# Patient Record
Sex: MALE | Race: WHITE | Employment: FULL TIME | URBAN - METROPOLITAN AREA
[De-identification: names, ages, dates, MRNs, and addresses within clinical notes are randomized per-mention and may not be internally consistent; named-entity substitution may affect disease eponyms.]

---

## 2021-10-25 ENCOUNTER — HOSPITAL ENCOUNTER (EMERGENCY)
Age: 36
Discharge: LEFT BEFORE DISCHARGE | End: 2021-10-25
Attending: EMERGENCY MEDICINE

## 2021-10-25 VITALS
HEART RATE: 94 BPM | HEIGHT: 74 IN | OXYGEN SATURATION: 96 % | WEIGHT: 245 LBS | TEMPERATURE: 98.8 F | RESPIRATION RATE: 18 BRPM | DIASTOLIC BLOOD PRESSURE: 88 MMHG | SYSTOLIC BLOOD PRESSURE: 147 MMHG | BODY MASS INDEX: 31.44 KG/M2

## 2021-10-25 DIAGNOSIS — Z53.21 PATIENT LEFT WITHOUT BEING SEEN: Primary | ICD-10-CM

## 2021-10-25 PROCEDURE — 75810000275 HC EMERGENCY DEPT VISIT NO LEVEL OF CARE

## 2021-10-26 NOTE — ED TRIAGE NOTES
Arrives with face mask in place. Arrives via [place] [facility] service with reports left shoulder pain. States initially began in HS while playing baseball. Seen by Louis Robin in past, was beginning proceedings for rotator cuff repair however never followed back up. Reports fall onto left shoulder couple years ago. EMS reports meth use approx 1 hour pta, endorses daily use. Friend with pt transported to [facility] for overdose tonight.  A/O X4 on arrival.

## 2021-10-27 NOTE — ED PROVIDER NOTES
Patient left without being seen        Drug Overdose    Shoulder Pain          Past Medical History:   Diagnosis Date    Infectious disease     Liver disease     hep c    Psychiatric disorder     PTSD, anxiety, depression    Seizures ([facility] [address]        Past Surgical History:   Procedure Laterality Date    HX HEENT      t&a    HX ORTHOPAEDIC      right knee     HX UROLOGICAL      cysto, kidney stone         No family history on file. Social History     Socioeconomic History    Marital status: SINGLE     Spouse name: Not on file    Number of children: Not on file    Years of education: Not on file    Highest education level: Not on file   Occupational History    Not on file   Tobacco Use    Smoking status: Current Every Day Smoker     Packs/day: 0.25    Smokeless tobacco: Never Used   Substance and Sexual Activity    Alcohol use: No    Drug use: No    Sexual activity: Not on file   Other Topics Concern    Not on file   Social History Narrative    Not on file     Social Determinants of Health     Financial Resource Strain:     Difficulty of Paying Living Expenses:    Food Insecurity:     Worried About Running Out of Food in the Last Year:     [address] in the Last Year:    Transportation Needs:     Lack of Transportation (Medical):  Lack of Transportation (Non-Medical):    Physical Activity:     Days of Exercise per Week:     Minutes of Exercise per Session:    Stress:     Feeling of Stress :    Social Connections:     Frequency of Communication with Friends and Family:     Frequency of Social Gatherings with Friends and Family:     Attends Muslim Services:     Active Member of Clubs or Organizations:     Attends Club or Organization Meetings:     Marital Status:    Intimate Partner Violence:     Fear of Current or Ex-Partner:     Emotionally Abused:     Physically Abused:     Sexually Abused:           ALLERGIES: Depakote [divalproex], Dilantin [phenytoin sodium extended], Ketamine, Lidocaine, Nubain [nalbuphine], Stadol [butorphanol tartrate], Toradol [ketorolac tromethamine], and Ultram [tramadol]    Review of Systems    Vitals:    10/25/21 2021   BP: (!) 147/88   Pulse: 94   Resp: 18   Temp: 98.8 °F (37.1 °C)   SpO2: 96%   Weight: 111.1 kg (245 lb)   Height: 6' 2\" (1.88 m)            Physical Exam     MDM       Procedures

## 2021-12-02 ENCOUNTER — HOSPITAL ENCOUNTER (EMERGENCY)
Age: 36
Discharge: HOME | End: 2021-12-02
Attending: EMERGENCY MEDICINE

## 2021-12-02 VITALS
DIASTOLIC BLOOD PRESSURE: 84 MMHG | SYSTOLIC BLOOD PRESSURE: 133 MMHG | HEIGHT: 74 IN | BODY MASS INDEX: 32.02 KG/M2 | WEIGHT: 249.5 LBS | TEMPERATURE: 98.2 F | HEART RATE: 108 BPM | RESPIRATION RATE: 18 BRPM | OXYGEN SATURATION: 99 %

## 2021-12-02 DIAGNOSIS — L08.9 INFECTED SKIN LESION: Primary | ICD-10-CM

## 2021-12-02 PROCEDURE — 99282 EMERGENCY DEPT VISIT SF MDM: CPT

## 2021-12-02 RX ORDER — ALPRAZOLAM 2 MG/1
TABLET ORAL
COMMUNITY
Start: 2021-11-10

## 2021-12-02 RX ORDER — HYDROXYZINE PAMOATE 25 MG/1
25 CAPSULE ORAL
COMMUNITY
Start: 2021-11-16

## 2021-12-02 RX ORDER — BUPRENORPHINE HYDROCHLORIDE AND NALOXONE HYDROCHLORIDE DIHYDRATE 8; 2 MG/1; MG/1
TABLET SUBLINGUAL
COMMUNITY
Start: 2021-12-01

## 2021-12-02 RX ORDER — CITALOPRAM 10 MG/1
20 TABLET ORAL AT BEDTIME
COMMUNITY

## 2021-12-02 NOTE — DISCHARGE INSTRUCTIONS
Wash two-three times daily with soap and water, blot dry, apply neosporin and a clean dressing. Watch for redness, swelling, pus, increasing pain, fever and return if any of those symptoms begin. Finish all of the antibiotics. Return to the ED if worse.

## 2021-12-02 NOTE — PROGRESS NOTES
Meet with pt to discuss financial difficulties to fill his Bactrim for MRSA /skin infection. A pt family member next to us overheard our conversation and offered to pay for pt's $12.35 Rx. Pt accepted, no further needs at this time.

## 2021-12-02 NOTE — Clinical Note
[address] [address] EMERGENCY DEPT  [address] [address] [phone]    Work/School Note    Date: 12/2/2021    To Whom It May concern:      Beto Mae was seen and treated today in the emergency room by the following provider(s):  Attending Provider: Barry Aquino MD  Physician Assistant: Nino Painting is excused from work/school on 12/02/21. He is clear to return to work/school on 12/03/21.         Sincerely,          Navin Tejeda RN

## 2021-12-02 NOTE — Clinical Note
[address] [address] EMERGENCY DEPT  [address] [address] [phone]    Work/School Note    Date: 12/2/2021    To Whom It May concern:      Delia Lindsay was seen and treated today in the emergency room by the following provider(s):  Attending Provider: Dolores Silva MD  Physician Assistant: Tae Sheldon is excused from work/school on 12/02/21. He is clear to return to work/school on 12/03/21.         Sincerely,          DEBORAH Lyles

## 2021-12-02 NOTE — ED NOTES
I have reviewed discharge instructions with the patient. The patient verbalized understanding. Patient left ED via Discharge Method: ambulatory to Home with uber    Opportunity for questions and clarification provided. Patient given 0 scripts. To continue your aftercare when you leave the hospital, you may receive an automated call from our care team to check in on how you are doing. This is a free service and part of our promise to provide the best care and service to meet your aftercare needs.  If you have questions, or wish to unsubscribe from this service please call [phone]. Thank you for Choosing our [facility] Emergency Department.

## 2021-12-02 NOTE — ED PROVIDER NOTES
Patient is here with 3 infected lesions left forearm that have been there for a week or 2. He does have a prescription for Bactrim DS but cannot afford to get it. No nausea, vomiting, fever, chest pain, shortness of breath, injury to the area other new symptoms. He has a history of MRSA. He is currently in recovery for 31 days and taking Suboxone. He ambulated to triage without difficulty and is well hydrated. The history is provided by the patient. Skin Problem  This is a recurrent problem. The current episode started more than 2 days ago. The problem occurs constantly. The problem has not changed since onset. Pertinent negatives include no chest pain, no abdominal pain, no headaches and no shortness of breath. Nothing aggravates the symptoms. Nothing relieves the symptoms. He has tried nothing for the symptoms. Past Medical History:   Diagnosis Date    Infectious disease     Liver disease     hep c    Psychiatric disorder     PTSD, anxiety, depression    Seizures ([facility] [address]        Past Surgical History:   Procedure Laterality Date    HX HEENT      t&a    HX ORTHOPAEDIC      right knee     HX UROLOGICAL      cysto, kidney stone         No family history on file.     Social History     Socioeconomic History    Marital status: SINGLE     Spouse name: Not on file    Number of children: Not on file    Years of education: Not on file    Highest education level: Not on file   Occupational History    Not on file   Tobacco Use    Smoking status: Current Every Day Smoker     Packs/day: 0.25    Smokeless tobacco: Never Used   Substance and Sexual Activity    Alcohol use: No    Drug use: No    Sexual activity: Not on file   Other Topics Concern    Not on file   Social History Narrative    Not on file     Social Determinants of Health     Financial Resource Strain:     Difficulty of Paying Living Expenses: Not on file   Food Insecurity:     Worried About Running Out of Food in the Last Year: Not on file    [address] in the Last Year: Not on file   Transportation Needs:     Lack of Transportation (Medical): Not on file    Lack of Transportation (Non-Medical): Not on file   Physical Activity:     Days of Exercise per Week: Not on file    Minutes of Exercise per Session: Not on file   Stress:     Feeling of Stress : Not on file   Social Connections:     Frequency of Communication with Friends and Family: Not on file    Frequency of Social Gatherings with Friends and Family: Not on file    Attends Religion Services: Not on file    Active Member of [address] [place] [address] [employer] or Organizations: Not on file    Attends Club or Organization Meetings: Not on file    Marital Status: Not on file   Intimate Partner Violence:     Fear of Current or Ex-Partner: Not on file    Emotionally Abused: Not on file    Physically Abused: Not on file    Sexually Abused: Not on file   Housing Stability:     Unable to Pay for Housing in the Last Year: Not on file    Number of [place] in the Last Year: Not on file    Unstable Housing in the Last Year: Not on file         ALLERGIES: Depakote [divalproex], Dilantin [phenytoin sodium extended], Ketamine, Lidocaine, Nubain [nalbuphine], Stadol [butorphanol tartrate], Toradol [ketorolac tromethamine], and Ultram [tramadol]    Review of Systems   Constitutional: Negative. HENT: Negative. Eyes: Negative. Respiratory: Negative. Negative for shortness of breath. Cardiovascular: Negative. Negative for chest pain. Gastrointestinal: Negative. Negative for abdominal pain. Genitourinary: Negative. Musculoskeletal: Negative. Skin: Positive for color change and wound. Neurological: Negative. Negative for headaches. Psychiatric/Behavioral: Negative. All other systems reviewed and are negative. There were no vitals filed for this visit. Physical Exam  Vitals and nursing note reviewed. Constitutional:       Appearance: He is well-developed. HENT:      Head: Normocephalic and atraumatic. Right Ear: External ear normal.      Left Ear: External ear normal.      Nose: Nose normal.      Mouth/Throat:      Mouth: Mucous membranes are moist.   Eyes:      Extraocular Movements: Extraocular movements intact. Conjunctiva/sclera: Conjunctivae normal.      Pupils: Pupils are equal, round, and reactive to light. Cardiovascular:      Rate and Rhythm: Normal rate and regular rhythm. Heart sounds: Normal heart sounds. Pulmonary:      Effort: Pulmonary effort is normal.      Breath sounds: Normal breath sounds. Abdominal:      General: Bowel sounds are normal.      Palpations: Abdomen is soft. Musculoskeletal:         General: Normal range of motion. Left forearm: Tenderness present. No swelling, edema, deformity, lacerations or bony tenderness. Arms:       Cervical back: Normal range of motion and neck supple. Skin:     General: Skin is warm and dry. Capillary Refill: Capillary refill takes less than 2 seconds. Findings: Erythema present. Neurological:      General: No focal deficit present. Mental Status: He is alert and oriented to person, place, and time. Deep Tendon Reflexes: Reflexes are normal and symmetric. Psychiatric:         Mood and Affect: Mood normal.         Behavior: Behavior normal.         Thought Content: Thought content normal.         Judgment: Judgment normal.          MDM  Number of Diagnoses or Management Options  Risk of Complications, Morbidity, and/or Mortality  Presenting problems: moderate  Diagnostic procedures: moderate  Management options: moderate    Patient Progress  Patient progress: stable         Procedures        The patient was observed in the ED. Lauryn Del Valle RN to help patient obtain Bactrim DS. Wash two-three times daily with soap and water, blot dry, apply neosporin and a clean dressing.  Watch for redness, swelling, pus, increasing pain, fever and return if any of those symptoms begin. Finish all of the antibiotics. Return to the ED if worse. Patient is stable for discharge and ambulatory out of the ED without difficulty. I discussed the results of all labs, procedures, radiographs, and treatments with the patient and available family. Treatment plan is agreed upon and the patient is ready for discharge. All voiced understanding of the discharge plan and medication instructions or changes as appropriate. Questions about treatment in the ED were answered. All were encouraged to return should symptoms worsen or new problems develop.

## 2021-12-27 ENCOUNTER — HOSPITAL ENCOUNTER (EMERGENCY)
Age: 36
Discharge: HOME | End: 2021-12-27
Attending: EMERGENCY MEDICINE

## 2021-12-27 VITALS
HEIGHT: 74 IN | OXYGEN SATURATION: 98 % | TEMPERATURE: 98.3 F | HEART RATE: 96 BPM | DIASTOLIC BLOOD PRESSURE: 75 MMHG | SYSTOLIC BLOOD PRESSURE: 125 MMHG | BODY MASS INDEX: 32.73 KG/M2 | WEIGHT: 255 LBS | RESPIRATION RATE: 20 BRPM

## 2021-12-27 DIAGNOSIS — L73.9 FOLLICULITIS: ICD-10-CM

## 2021-12-27 DIAGNOSIS — Z22.322 MRSA COLONIZATION: Primary | ICD-10-CM

## 2021-12-27 LAB
ALBUMIN SERPL-MCNC: 3.8 G/DL (ref 3.5–5)
ALBUMIN/GLOB SERPL: 0.7 {RATIO} (ref 1.2–3.5)
ALP SERPL-CCNC: 129 U/L (ref 50–136)
ALT SERPL-CCNC: 62 U/L (ref 12–65)
ANION GAP SERPL CALC-SCNC: 2 MMOL/L (ref 7–16)
AST SERPL-CCNC: 27 U/L (ref 15–37)
BASOPHILS # BLD: 0.1 K/UL (ref 0–0.2)
BASOPHILS NFR BLD: 1 % (ref 0–2)
BILIRUB SERPL-MCNC: 0.3 MG/DL (ref 0.2–1.1)
BUN SERPL-MCNC: 12 MG/DL (ref 6–23)
CALCIUM SERPL-MCNC: 9.7 MG/DL (ref 8.3–10.4)
CHLORIDE SERPL-SCNC: 102 MMOL/L (ref 98–107)
CO2 SERPL-SCNC: 33 MMOL/L (ref 21–32)
COVID-19 RAPID TEST, COVR: NOT DETECTED
CREAT SERPL-MCNC: 0.73 MG/DL (ref 0.8–1.5)
DIFFERENTIAL METHOD BLD: ABNORMAL
EOSINOPHIL # BLD: 0.6 K/UL (ref 0–0.8)
EOSINOPHIL NFR BLD: 5 % (ref 0.5–7.8)
ERYTHROCYTE [DISTWIDTH] IN BLOOD BY AUTOMATED COUNT: 11.5 % (ref 11.9–14.6)
GLOBULIN SER CALC-MCNC: 5.4 G/DL (ref 2.3–3.5)
GLUCOSE SERPL-MCNC: 102 MG/DL (ref 65–100)
HCT VFR BLD AUTO: 48.6 % (ref 41.1–50.3)
HGB BLD-MCNC: 16.2 G/DL (ref 13.6–17.2)
IMM GRANULOCYTES # BLD AUTO: 0.1 K/UL (ref 0–0.5)
IMM GRANULOCYTES NFR BLD AUTO: 1 % (ref 0–5)
LYMPHOCYTES # BLD: 2.1 K/UL (ref 0.5–4.6)
LYMPHOCYTES NFR BLD: 18 % (ref 13–44)
MCH RBC QN AUTO: 30.3 PG (ref 26.1–32.9)
MCHC RBC AUTO-ENTMCNC: 33.3 G/DL (ref 31.4–35)
MCV RBC AUTO: 90.8 FL (ref 79.6–97.8)
MONOCYTES # BLD: 0.8 K/UL (ref 0.1–1.3)
MONOCYTES NFR BLD: 7 % (ref 4–12)
NEUTS SEG # BLD: 8 K/UL (ref 1.7–8.2)
NEUTS SEG NFR BLD: 69 % (ref 43–78)
NRBC # BLD: 0 K/UL (ref 0–0.2)
PLATELET # BLD AUTO: 396 K/UL (ref 150–450)
PMV BLD AUTO: 10.5 FL (ref 9.4–12.3)
POTASSIUM SERPL-SCNC: 4.7 MMOL/L (ref 3.5–5.1)
PROT SERPL-MCNC: 9.2 G/DL (ref 6.3–8.2)
RBC # BLD AUTO: 5.35 M/UL (ref 4.23–5.6)
SODIUM SERPL-SCNC: 137 MMOL/L (ref 136–145)
SOURCE, COVRS: NORMAL
WBC # BLD AUTO: 11.6 K/UL (ref 4.3–11.1)

## 2021-12-27 PROCEDURE — 99283 EMERGENCY DEPT VISIT LOW MDM: CPT

## 2021-12-27 PROCEDURE — 87635 SARS-COV-2 COVID-19 AMP PRB: CPT

## 2021-12-27 PROCEDURE — 80053 COMPREHEN METABOLIC PANEL: CPT

## 2021-12-27 PROCEDURE — 85025 COMPLETE CBC W/AUTO DIFF WBC: CPT

## 2021-12-27 RX ORDER — DOXYCYCLINE HYCLATE 100 MG
100 TABLET ORAL 2 TIMES DAILY
Qty: 42 TABLET | Refills: 0 | Status: SHIPPED | OUTPATIENT
Start: 2021-12-27 | End: 2022-01-17

## 2021-12-27 NOTE — ED TRIAGE NOTES
Pt has an infection on left arm for about one month. Pt has had multiple rounds of oral antibiotics but the infection is getting worse. Told to come to ER by PCP for wound culture and possible IV antibiotics. Masked for triage.

## 2021-12-27 NOTE — ED PROVIDER NOTES
70-year-old male with a history of hepatitis C, MRSA, seizures, PTSD, opiate abuse currently on Suboxone presents with multiple skin lesions that have been worsening over the past few months. He reports being on 2 courses of Bactrim, 1 course of doxycycline, and one course of Keflex. His addiction medicine specialist advised him to come to the emergency department for possible IV antibiotics. He denies fevers or chills. He is attempting to not pick at the lesions, but states he is not always successful. He has 1 lesion on his left arm that has been draining. Skin Infection         Past Medical History:   Diagnosis Date    Infectious disease     Liver disease     hep c    Psychiatric disorder     PTSD, anxiety, depression    Seizures ([facility] [address]        Past Surgical History:   Procedure Laterality Date    HX HEENT      t&a    HX ORTHOPAEDIC      right knee     HX UROLOGICAL      cysto, kidney stone         No family history on file. Social History     Socioeconomic History    Marital status: SINGLE     Spouse name: Not on file    Number of children: Not on file    Years of education: Not on file    Highest education level: Not on file   Occupational History    Not on file   Tobacco Use    Smoking status: Current Every Day Smoker     Packs/day: 0.25    Smokeless tobacco: Never Used   Substance and Sexual Activity    Alcohol use: No    Drug use: Not Currently    Sexual activity: Not on file   Other Topics Concern    Not on file   Social History Narrative    Not on file     Social Determinants of Health     Financial Resource Strain:     Difficulty of Paying Living Expenses: Not on file   Food Insecurity:     Worried About Running Out of Food in the Last Year: Not on file    David of Food in the Last Year: Not on file   Transportation Needs:     Lack of Transportation (Medical): Not on file    Lack of Transportation (Non-Medical):  Not on file   Physical Activity:     Days of Exercise per Week: Not on file    Minutes of Exercise per Session: Not on file   Stress:     Feeling of Stress : Not on file   Social Connections:     Frequency of Communication with Friends and Family: Not on file    Frequency of Social Gatherings with Friends and Family: Not on file    Attends [employer] Services: Not on file    Active Member of [address] [place] [address] or Organizations: Not on file    Attends Club or Organization Meetings: Not on file    Marital Status: Not on file   Intimate Partner Violence:     Fear of Current or Ex-Partner: Not on file    Emotionally Abused: Not on file    Physically Abused: Not on file    Sexually Abused: Not on file   Housing Stability:     Unable to Pay for Housing in the Last Year: Not on file    Number of [place] in the Last Year: Not on file    Unstable Housing in the Last Year: Not on file         ALLERGIES: Depakote [divalproex], Dilantin [phenytoin sodium extended], Ketamine, Lidocaine, Nubain [nalbuphine], Stadol [butorphanol tartrate], Toradol [ketorolac tromethamine], and Ultram [tramadol]    Review of Systems   Constitutional: Negative for fever. Gastrointestinal: Negative for vomiting. Skin: Positive for wound. All other systems reviewed and are negative. Vitals:    12/27/21 1232 12/27/21 1448   BP: 137/81    Pulse: (!) 101    Resp: 20    Temp: 98.4 °F (36.9 °C)    SpO2: 99% 99%   Weight: 115.7 kg (255 lb)    Height: 6' 2\" (1.88 m)             Physical Exam  Vitals and nursing note reviewed. Constitutional:       Appearance: Normal appearance. HENT:      Head: Normocephalic and atraumatic. Cardiovascular:      Rate and Rhythm: Tachycardia present. Pulmonary:      Effort: Pulmonary effort is normal.   Musculoskeletal:         General: Normal range of motion. Skin:     Comments: Multiple excoriated mildly erythematous lesions to left arm, hand, face, beard and hairline. Few scattered pustules. No vesicles. No streaking or dense cellulitis.   Small amount of purulent drainage from left forearm lesion. Neurological:      General: No focal deficit present. Mental Status: He is alert. Psychiatric:         Mood and Affect: Mood normal.          MDM  Number of Diagnoses or Management Options  Folliculitis  MRSA colonization  Diagnosis management comments: Parts of this document were created using dragon voice recognition software. The chart has been reviewed but errors may still be present. I wore appropriate PPE throughout this patient's ED visit. Rosa Maria Pereira MD, 3:08 PM    Appears like MRSA colonization with folliculitis. I do not believe he requires admission for IV antibiotics. Will place on 3-week course of doxycycline. Discussed he will need an ID specialist follow-up. Will prescribe mupirocin nasal ointment. I discussed the results of all labs, procedures, radiographs, and treatments with the patient and available family. Treatment plan is agreed upon and the patient is ready for discharge. Questions about treatment in the ED and differential diagnosis of presenting condition were answered. Patient was given verbal discharge instructions including, but not limited to, importance of returning to the emergency department for any concern of worsening or continued symptoms. Instructions were given to follow up with a primary care provider or specialist within 1-2 days. Adverse effects of medications, if prescribed, were discussed and patient was advised to refrain from significant physical activity until followed up by primary care physician and to not drive or operate heavy machinery after taking any sedating substances.            Amount and/or Complexity of Data Reviewed  Clinical lab tests: reviewed (Results for orders placed or performed during the hospital encounter of 12/27/21  -COVID-19 RAPID TEST:        Result                      Value             Ref Range           Specimen source Nasopharyngeal       COVID-19 rapid test         Not detected      NOTD           -CBC WITH AUTOMATED DIFF:        Result                      Value             Ref Range           WBC                         11.6 (H)          4.3 - 11.1 K*       RBC                         5.35              4.23 - 5.6 M*       HGB                         16.2              13.6 - 17.2 *       HCT                         48.6              41.1 - 50.3 %       MCV                         90.8              79.6 - 97.8 *       MCH                         30.3              26.1 - 32.9 *       MCHC                        33.3              31.4 - 35.0 *       RDW                         11.5 (L)          11.9 - 14.6 %       PLATELET                    396               150 - 450 K/*       MPV                         10.5              9.4 - 12.3 FL       ABSOLUTE NRBC               0.00              0.0 - 0.2 K/*       DF                          AUTOMATED                             NEUTROPHILS                 69                43 - 78 %           LYMPHOCYTES                 18                13 - 44 %           MONOCYTES                   7                 4.0 - 12.0 %        EOSINOPHILS                 5                 0.5 - 7.8 %         BASOPHILS                   1                 0.0 - 2.0 %         IMMATURE GRANULOCYTES       1                 0.0 - 5.0 %         ABS. NEUTROPHILS            8.0               1.7 - 8.2 K/*       ABS. LYMPHOCYTES            2.1               0.5 - 4.6 K/*       ABS. MONOCYTES              0.8               0.1 - 1.3 K/*       ABS. EOSINOPHILS            0.6               0.0 - 0.8 K/*       ABS. BASOPHILS              0.1               0.0 - 0.2 K/*       ABS. IMM.  GRANS.            0.1               0.0 - 0.5 K/*  -METABOLIC PANEL, COMPREHENSIVE:        Result                      Value             Ref Range           Sodium                      137               136 - 145 mm*       Potassium 4.7               3.5 - 5.1 mm*       Chloride                    102               98 - 107 mmo*       CO2                         33 (H)            21 - 32 mmol*       Anion gap                   2 (L)             7 - 16 mmol/L       Glucose                     102 (H)           65 - 100 mg/*       BUN                         12                6 - 23 MG/DL        Creatinine                  0.73 (L)          0.8 - 1.5 MG*       GFR est AA                  >60               >60 ml/min/1*       GFR est non-AA              >60               >60 ml/min/1*       Calcium                     9.7               8.3 - 10.4 M*       Bilirubin, total            0.3               0.2 - 1.1 MG*       ALT (SGPT)                  62                12 - 65 U/L         AST (SGOT)                  27                15 - 37 U/L         Alk.  phosphatase            129               50 - 136 U/L        Protein, total              9.2 (H)           6.3 - 8.2 g/*       Albumin                     3.8               3.5 - 5.0 g/*       Globulin                    5.4 (H)           2.3 - 3.5 g/*       A-G Ratio                   0.7 (L)           1.2 - 3.5      )           Procedures

## 2021-12-27 NOTE — DISCHARGE INSTRUCTIONS
Do not pick at the scabs. Keep them clean with antibiotic soap like Dial and use exfoliating scrub daily. Take 3 weeks of antibiotic. Talk to your doctor about referral to infectious disease specialist.  Use ointment in your nose to help decrease risk of future infections. Return for worsening or concerning symptoms.

## 2021-12-27 NOTE — ED NOTES
I have reviewed discharge instructions with the patient. The patient verbalized understanding. Patient left ED via Discharge Method: ambulatory to Home with himself. Opportunity for questions and clarification provided. Patient given 2 scripts. To continue your aftercare when you leave the hospital, you may receive an automated call from our care team to check in on how you are doing. This is a free service and part of our promise to provide the best care and service to meet your aftercare needs.  If you have questions, or wish to unsubscribe from this service please call [phone]. Thank you for Choosing our [facility] Emergency Department.

## 2021-12-27 NOTE — ED NOTES
Pt presents to the ER due to worsening skin lesions. Patient took Bactrim 1 month ago. He then took Doxy for 10 days and finished it 4 days ago. He also took  Keflex and finished it 3 days ago. He feels that the rash is worse now  Patient complains of nasal congestion since yesterday. Mild cough last night. He denies any headache, fever or body aches. Pt called his PCP Dr. Cami Sainz who told him to go the ER for wound culture and IV antibiotics. 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     Constitutional.  Patient is in no apparent distress  Cardiovascular: Patient is tachycardic  Skin: Multiple scabbed over lesions on the face and upper extremities    Patient evaluated initially in triage. Rapid Medical Evaluation was conducted and necessary orders have been placed. I have performed a medical screening exam.  Care will now be transferred to the attending physician in the emergency department.   DEBORAH Montalvo 12:35 PM

## 2022-02-22 ENCOUNTER — HOSPITAL ENCOUNTER (EMERGENCY)
Age: 37
Discharge: HOME | End: 2022-02-22

## 2022-04-07 ENCOUNTER — APPOINTMENT (OUTPATIENT)
Dept: GENERAL RADIOLOGY | Age: 37
End: 2022-04-07
Attending: EMERGENCY MEDICINE

## 2022-04-07 ENCOUNTER — HOSPITAL ENCOUNTER (EMERGENCY)
Age: 37
Discharge: HOME | End: 2022-04-07
Attending: EMERGENCY MEDICINE

## 2022-04-07 VITALS
SYSTOLIC BLOOD PRESSURE: 131 MMHG | HEIGHT: 74 IN | RESPIRATION RATE: 14 BRPM | TEMPERATURE: 98.8 F | WEIGHT: 255 LBS | BODY MASS INDEX: 32.73 KG/M2 | HEART RATE: 82 BPM | OXYGEN SATURATION: 92 % | DIASTOLIC BLOOD PRESSURE: 75 MMHG

## 2022-04-07 DIAGNOSIS — S43.002A SUBLUXATION OF LEFT SHOULDER JOINT, INITIAL ENCOUNTER: Primary | ICD-10-CM

## 2022-04-07 DIAGNOSIS — M24.412 RECURRENT DISLOCATION OF LEFT SHOULDER: ICD-10-CM

## 2022-04-07 PROCEDURE — 99152 MOD SED SAME PHYS/QHP 5/>YRS: CPT

## 2022-04-07 PROCEDURE — 99285 EMERGENCY DEPT VISIT HI MDM: CPT

## 2022-04-07 PROCEDURE — 73030 X-RAY EXAM OF SHOULDER: CPT

## 2022-04-07 PROCEDURE — 74011250637 HC RX REV CODE- 250/637: Performed by: EMERGENCY MEDICINE

## 2022-04-07 PROCEDURE — 74011250636 HC RX REV CODE- 250/636: Performed by: EMERGENCY MEDICINE

## 2022-04-07 PROCEDURE — 75810000301 HC ER LEVEL 1 CLOSED TREATMNT FRACTURE/DISLOCATION

## 2022-04-07 PROCEDURE — 75810000303 HC CLSD TRMT  FRACTURE/DISLOCATION W/  ANES

## 2022-04-07 RX ORDER — IBUPROFEN 800 MG/1
800 TABLET ORAL
Status: COMPLETED | OUTPATIENT
Start: 2022-04-07 | End: 2022-04-07

## 2022-04-07 RX ORDER — PROPOFOL 10 MG/ML
200 INJECTION, EMULSION INTRAVENOUS
Status: COMPLETED | OUTPATIENT
Start: 2022-04-07 | End: 2022-04-07

## 2022-04-07 RX ORDER — SODIUM CHLORIDE 0.9 % (FLUSH) 0.9 %
5-40 SYRINGE (ML) INJECTION AS NEEDED
Status: DISCONTINUED | OUTPATIENT
Start: 2022-04-07 | End: 2022-04-07 | Stop reason: HOSPADM

## 2022-04-07 RX ORDER — SODIUM CHLORIDE 0.9 % (FLUSH) 0.9 %
5-40 SYRINGE (ML) INJECTION EVERY 8 HOURS
Status: DISCONTINUED | OUTPATIENT
Start: 2022-04-07 | End: 2022-04-07 | Stop reason: HOSPADM

## 2022-04-07 RX ADMIN — IBUPROFEN 800 MG: 800 TABLET, FILM COATED ORAL at 02:52

## 2022-04-07 RX ADMIN — PROPOFOL 180 MG: 10 INJECTION, EMULSION INTRAVENOUS at 02:35

## 2022-04-07 NOTE — ED PROVIDER NOTES
Presents emergency room with complaint of a left shoulder injury that occurred while he was leaving work tonight. He states he slipped on the floor and fell backwards and injured his left shoulder. He reports a history of previous left shoulder injury as a dislocation. He denies any other injuries and has no other complaints. Past medical history significant for chronic hepatitis C and opioid use disorder. The history is provided by the patient. Fall  The accident occurred less than 1 hour ago. The fall occurred while walking. He fell from a height of ground level. He landed on hard floor. There was no blood loss. The point of impact was the left shoulder. The pain is present in the left shoulder. The pain is at a severity of 9/10. The pain is severe. He was ambulatory at the scene. There was entrapment after the fall. There was drug use involved in the accident. There was alcohol use involved in the accident. Pertinent negatives include no visual change, no fever, no abdominal pain, no bowel incontinence, no nausea, no vomiting, no hematuria, no headaches, no extremity weakness, no hearing loss, no loss of consciousness and no laceration. The symptoms are aggravated by activity, use of injured limb and pressure on injury. He has tried nothing for the symptoms. The treatment provided no relief. Past Medical History:   Diagnosis Date    Infectious disease     Liver disease     hep c    Psychiatric disorder     PTSD, anxiety, depression    Seizures ([facility] [address]        Past Surgical History:   Procedure Laterality Date    HX HEENT      t&a    HX ORTHOPAEDIC      right knee     HX UROLOGICAL      cysto, kidney stone         History reviewed. No pertinent family history.     Social History     Socioeconomic History    Marital status: SINGLE     Spouse name: Not on file    Number of children: Not on file    Years of education: Not on file    Highest education level: Not on file   Occupational History  Not on file   Tobacco Use    Smoking status: Current Every Day Smoker     Packs/day: 0.25    Smokeless tobacco: Never Used   Substance and Sexual Activity    Alcohol use: No    Drug use: Not Currently    Sexual activity: Not on file   Other Topics Concern    Not on file   Social History Narrative    Not on file     Social Determinants of Health     Financial Resource Strain:     Difficulty of Paying Living Expenses: Not on file   Food Insecurity:     Worried About Running Out of Food in the Last Year: Not on file    David of Food in the Last Year: Not on file   Transportation Needs:     Lack of Transportation (Medical): Not on file    Lack of Transportation (Non-Medical): Not on file   Physical Activity:     Days of Exercise per Week: Not on file    Minutes of Exercise per Session: Not on file   Stress:     Feeling of Stress : Not on file   Social Connections:     Frequency of Communication with Friends and Family: Not on file    Frequency of Social Gatherings with Friends and Family: Not on file    Attends Orthodoxy Services: Not on file    Active Member of [address] [place] [address] or Organizations: Not on file    Attends Club or Organization Meetings: Not on file    Marital Status: Not on file   Intimate Partner Violence:     Fear of Current or Ex-Partner: Not on file    Emotionally Abused: Not on file    Physically Abused: Not on file    Sexually Abused: Not on file   Housing Stability:     Unable to Pay for Housing in the Last Year: Not on file    Number of [place] in the Last Year: Not on file    Unstable Housing in the Last Year: Not on file         ALLERGIES: Depakote [divalproex], Dilantin [phenytoin sodium extended], Ketamine, Lidocaine, Nubain [nalbuphine], Stadol [butorphanol tartrate], Toradol [ketorolac tromethamine], and Ultram [tramadol]    Review of Systems   Constitutional: Negative for chills and fever.    Gastrointestinal: Negative for abdominal pain, bowel incontinence, nausea and vomiting. Genitourinary: Negative for hematuria. Musculoskeletal: Negative for extremity weakness. Neurological: Negative for loss of consciousness and headaches. All other systems reviewed and are negative. Vitals:    04/07/22 0110 04/07/22 0111   BP: (!) 162/93    Pulse: (!) 112    Resp: 18    Temp: 98.8 °F (37.1 °C)    SpO2: 95% 95%   Weight: 115.7 kg (255 lb)    Height: 6' 2\" (1.88 m)             Physical Exam  Vitals and nursing note reviewed. Constitutional:       General: He is not in acute distress. Appearance: Normal appearance. He is not toxic-appearing or diaphoretic. HENT:      Head: Normocephalic and atraumatic. Eyes:      Extraocular Movements: Extraocular movements intact. Conjunctiva/sclera: Conjunctivae normal.      Pupils: Pupils are equal, round, and reactive to light. Cardiovascular:      Rate and Rhythm: Tachycardia present. Pulmonary:      Effort: Pulmonary effort is normal.   Chest:      Chest wall: No tenderness. Abdominal:      Tenderness: There is no abdominal tenderness. Musculoskeletal:         General: Tenderness and deformity present. Cervical back: Normal range of motion and neck supple. No rigidity or tenderness. Comments: Semination of the left shoulder demonstrates step-off deformity with possible anterior dislocation. The left upper extremity has decrease of range of motion at the left shoulder but otherwise neurovascular intact   Skin:     General: Skin is warm and dry. Capillary Refill: Capillary refill takes less than 2 seconds. Findings: No laceration. Neurological:      General: No focal deficit present. Mental Status: He is alert and oriented to person, place, and time. Mental status is at baseline.    Psychiatric:         Mood and Affect: Mood normal.         Behavior: Behavior normal.          MDM  Number of Diagnoses or Management Options  Recurrent dislocation of left shoulder  Subluxation of left shoulder joint, initial encounter  Diagnosis management comments: Patient's been seen by multiple different orthopedic groups for his recurring left shoulder dislocation. We discussed the need for close orthopedic follow-up and he expressed an interest and desire to follow-up with Maxim Mata at the [facility]. Amount and/or Complexity of Data Reviewed  Tests in the radiology section of CPT®: ordered and reviewed  Independent visualization of images, tracings, or specimens: yes    Risk of Complications, Morbidity, and/or Mortality  Presenting problems: moderate  Diagnostic procedures: moderate  Management options: moderate    Patient Progress  Patient progress: improved         Procedural Sedation    Date/Time: 4/7/2022 2:50 AM  Performed by: Kurt Almendarez DO  Authorized by:  Kurt Almendarez DO     Consent:     Consent obtained:  Written    Risks discussed:  Inadequate sedation  Indications:     Procedure performed:  Dislocation reduction    Procedure necessitating sedation performed by:  Physician performing sedation    Intended level of sedation:  Moderate (conscious sedation)  Pre-sedation assessment:     NPO status caution: urgency dictates proceeding with non-ideal NPO status      ASA classification: class 1 - normal, healthy patient      Neck mobility: normal      Mouth opening:  3 or more finger widths    Mallampati score:  I - soft palate, uvula, fauces, pillars visible    Pre-sedation assessments completed and reviewed: airway patency, anesthesia/sedation history, cardiovascular function, hydration status and pain level      History of difficult intubation: no      Pre-sedation assessment completed:  4/7/2022 2:10 AM  Immediate pre-procedure details:     Reassessment: Patient reassessed immediately prior to procedure      Reviewed: vital signs      Verified: bag valve mask available, emergency equipment available, intubation equipment available, IV patency confirmed and oxygen available    Procedure details (see MAR for exact dosages):     Sedation start time:  4/7/2022 2:31 AM    Preoxygenation:  Nasal cannula    Sedation:  Propofol    Intra-procedure monitoring:  Blood pressure monitoring, cardiac monitor, continuous pulse oximetry and frequent LOC assessments    Intra-procedure events: none      Total sedation time (minutes):  10  Post-procedure details:     Post-sedation assessment completed:  4/7/2022 2:52 AM    Attendance: Constant attendance by certified staff until patient recovered      Recovery: Patient returned to pre-procedure baseline      Estimated blood loss (see I/O flowsheets): no      Post-sedation assessments completed and reviewed: airway patency, cardiovascular function, hydration status, mental status, nausea/vomiting and pain level      Specimens recovered:  None    Patient is stable for discharge or admission: yes      Patient tolerance: Tolerated well, no immediate complications  Reduction of Joint    Date/Time: 4/7/2022 2:52 AM  Performed by: Miriam Moody DO  Authorized by: Miriam Moody DO     Consent:     Consent obtained:  Written    Consent given by:  Patient    Risks discussed:  Pain    Alternatives discussed:  No treatment  Injury:     Injury location:  Shoulder    Shoulder injury location:  L shoulder    Hill-Sachs deformity: no    Pre-procedure assessment:     Neurological function: normal      Distal perfusion: normal      Range of motion: reduced    Sedation:     Sedation type: Moderate (conscious) sedation  Anesthesia (see MAR for exact dosages): Anesthesia method:  None  Procedure details:     Manipulation performed: yes      Skin traction used: no      Skeletal traction used: no      Pin inserted: no      Reduction successful: incomplete. X-ray confirmed reduction: yes    Post-procedure details:     Neurological function: normal      Distal perfusion: normal      Range of motion: unchanged      Patient tolerance of procedure:   Tolerated well, no immediate complications  Comments:      Due to history of multiple recurrent dislocations it appears there is significant incompetence of the joint capsule allowing for moderate subluxation.

## 2022-04-07 NOTE — ED TRIAGE NOTES
Patient ambulatory to triage after slipping at work due to the floor being wet and grease being on it. Patient states he feels like his left shoulder is dislocated.

## 2022-04-07 NOTE — ED NOTES
I have reviewed discharge instructions with the patient. The patient verbalized understanding. Patient left ED via Discharge Method: ambulatory to Home with uber. Opportunity for questions and clarification provided. Patient given 0 scripts. To continue your aftercare when you leave the hospital, you may receive an automated call from our care team to check in on how you are doing. This is a free service and part of our promise to provide the best care and service to meet your aftercare needs.  If you have questions, or wish to unsubscribe from this service please call [phone]. Thank you for Choosing our [address] [place] [address] Emergency Department.

## 2022-12-15 ENCOUNTER — HOSPITAL ENCOUNTER (EMERGENCY)
Age: 37
Discharge: HOME | End: 2022-12-15
Attending: EMERGENCY MEDICINE

## 2022-12-15 ENCOUNTER — HOSPITAL ENCOUNTER (EMERGENCY)
Dept: GENERAL RADIOLOGY | Age: 37
End: 2022-12-15

## 2022-12-15 ENCOUNTER — APPOINTMENT (OUTPATIENT)
Dept: GENERAL RADIOLOGY | Age: 37
End: 2022-12-15

## 2022-12-15 VITALS
OXYGEN SATURATION: 100 % | HEART RATE: 87 BPM | DIASTOLIC BLOOD PRESSURE: 73 MMHG | SYSTOLIC BLOOD PRESSURE: 118 MMHG | WEIGHT: 273 LBS | RESPIRATION RATE: 14 BRPM | HEIGHT: 74 IN | BODY MASS INDEX: 35.04 KG/M2 | TEMPERATURE: 98.5 F

## 2022-12-15 DIAGNOSIS — S43.005A DISLOCATION OF LEFT SHOULDER JOINT, INITIAL ENCOUNTER: Primary | ICD-10-CM

## 2022-12-15 PROCEDURE — 73020 X-RAY EXAM OF SHOULDER: CPT

## 2022-12-15 PROCEDURE — 73030 X-RAY EXAM OF SHOULDER: CPT

## 2022-12-15 PROCEDURE — 6360000002 HC RX W HCPCS: Performed by: EMERGENCY MEDICINE

## 2022-12-15 PROCEDURE — 99285 EMERGENCY DEPT VISIT HI MDM: CPT

## 2022-12-15 PROCEDURE — 23650 CLTX SHO DSLC W/MNPJ WO ANES: CPT

## 2022-12-15 RX ORDER — PROPOFOL 10 MG/ML
1 INJECTION, EMULSION INTRAVENOUS
Status: COMPLETED | OUTPATIENT
Start: 2022-12-15 | End: 2022-12-15

## 2022-12-15 RX ORDER — BUPRENORPHINE AND NALOXONE 8; 2 MG/1; MG/1
FILM, SOLUBLE BUCCAL; SUBLINGUAL
COMMUNITY
Start: 2022-12-13

## 2022-12-15 RX ORDER — MIDAZOLAM HYDROCHLORIDE 1 MG/ML
INJECTION INTRAMUSCULAR; INTRAVENOUS
Status: DISCONTINUED
Start: 2022-12-15 | End: 2022-12-16 | Stop reason: HOSPADM

## 2022-12-15 RX ORDER — MIDAZOLAM HYDROCHLORIDE 1 MG/ML
2 INJECTION INTRAMUSCULAR; INTRAVENOUS ONCE
Status: COMPLETED | OUTPATIENT
Start: 2022-12-15 | End: 2022-12-15

## 2022-12-15 RX ADMIN — PROPOFOL 40 MG: 10 INJECTION, EMULSION INTRAVENOUS at 22:30

## 2022-12-15 RX ADMIN — PROPOFOL 80 MG: 10 INJECTION, EMULSION INTRAVENOUS at 22:23

## 2022-12-15 RX ADMIN — MIDAZOLAM HYDROCHLORIDE 2 MG: 1 INJECTION, SOLUTION INTRAMUSCULAR; INTRAVENOUS at 22:33

## 2022-12-15 RX ADMIN — PROPOFOL 40 MG: 10 INJECTION, EMULSION INTRAVENOUS at 22:25

## 2022-12-15 ASSESSMENT — LIFESTYLE VARIABLES
HOW MANY STANDARD DRINKS CONTAINING ALCOHOL DO YOU HAVE ON A TYPICAL DAY: 1 OR 2
HOW OFTEN DO YOU HAVE A DRINK CONTAINING ALCOHOL: MONTHLY OR LESS

## 2022-12-15 ASSESSMENT — ENCOUNTER SYMPTOMS
RESPIRATORY NEGATIVE: 1
EYES NEGATIVE: 1
GASTROINTESTINAL NEGATIVE: 1

## 2022-12-15 ASSESSMENT — PAIN DESCRIPTION - LOCATION: LOCATION: SHOULDER

## 2022-12-15 ASSESSMENT — PAIN SCALES - GENERAL: PAINLEVEL_OUTOF10: 5

## 2022-12-15 NOTE — Clinical Note
Shirley Soham was seen and treated in our emergency department on 12/15/2022. He may return to work on 12/17/2022. If you have any questions or concerns, please don't hesitate to call.       Kirill Falk MD

## 2022-12-16 NOTE — ED PROVIDER NOTES
Emergency Department Provider Note                   PCP:                Alejandro Wilkes MD               Age: 30 y.o. Sex: male       ICD-10-CM    1. Dislocation of left shoulder joint, initial encounter  S43.005A           DISPOSITION Decision To Discharge 12/15/2022 11:17:25 PM        MDM  Number of Diagnoses or Management Options  Dislocation of left shoulder joint, initial encounter  Diagnosis management comments: Patient states he has an orthopedic surgeon who is aware of this injury and dislocation and wanted him to get it reduced and if he gets it reduced he states he will likely have surgery on it within approximately a week. We did have a successful reduction today. The patient was placed in a sling. Neurovascularly intact. Patient has orthopedic team to follow-up with. Stable for discharge home. Orders Placed This Encounter   Procedures    XR SHOULDER LEFT (MIN 2 VIEWS)    XR SHOULDER LEFT 1 VW    Procedural sedation    [employer] [employer] Shoulder Immobilizer, Left; L        Medications   midazolam (VERSED) 5 MG/5ML injection (has no administration in time range)   propofol 124 mg injection (40 mg IntraVENous Given by Other 12/15/22 2230)   midazolam (VERSED) injection 2 mg (2 mg IntraVENous Given 12/15/22 2233)       New Prescriptions    No medications on file        Suzanne Brown is a 30 y.o. male who presents to the Emergency Department with chief complaint of    Chief Complaint   Patient presents with    Shoulder Injury      57-year-old male with a history of shoulder dislocations on his left side after injury presents after mechanical slip and fall on outstretched arm. He says his arm feels like it dislocated again. He describes slight tingling in all of his fingers but says he can still feel. No other injuries. Review of Systems   HENT: Negative. Eyes: Negative. Respiratory: Negative. Cardiovascular: Negative. Gastrointestinal: Negative. Genitourinary: Negative. Musculoskeletal:  Positive for arthralgias. Skin: Negative. Neurological: Negative. Psychiatric/Behavioral: Negative. Past Medical History:   Diagnosis Date    Infectious disease     Liver disease     hep c    Psychiatric disorder     PTSD, anxiety, depression    Seizures ([facility] [address]         Past Surgical History:   Procedure Laterality Date    HEENT      t&a    ORTHOPEDIC SURGERY      right knee     UROLOGICAL SURGERY      cysto, kidney stone        History reviewed. No pertinent family history. Social History     Socioeconomic History    Marital status: Single     Spouse name: None    Number of children: None    Years of education: None    Highest education level: None   Tobacco Use    Smoking status: Every Day     Packs/day: 0.25     Types: Cigarettes    Smokeless tobacco: Never   Substance and Sexual Activity    Alcohol use: No    Drug use: Not Currently         Latex, Tramadol, Depakote [divalproex sodium], Dilantin [phenytoin], Ketamine, Nubain [nalbuphine], Stadol [butorphanol], Toradol [ketorolac tromethamine], Valproic acid, and Lidocaine     Previous Medications    BUPRENORPHINE-NALOXONE (SUBOXONE) 8-2 MG FILM SL FILM    Place 2 and a quarter films under the tongue daily for 7 days. Vitals signs and nursing note reviewed. Patient Vitals for the past 4 hrs:   Temp Pulse Resp BP SpO2   12/15/22 2255 -- 87 14 118/73 100 %   12/15/22 2250 -- 90 18 117/76 99 %   12/15/22 2236 -- 87 16 118/81 98 %   12/15/22 2231 -- 98 20 111/75 100 %   12/15/22 2226 -- 100 14 123/73 99 %   12/15/22 2221 98.5 °F (36.9 °C) 93 19 124/80 100 %   12/15/22 2143 -- 94 -- 130/74 100 %   12/15/22 1941 97.9 °F (36.6 °C) 87 16 (!) 151/103 100 %          Physical Exam  Constitutional:       General: He is not in acute distress. Appearance: Normal appearance. He is not ill-appearing. HENT:      Head: Normocephalic and atraumatic. Nose: No rhinorrhea.       Mouth/Throat: Mouth: Mucous membranes are moist.   Eyes:      Extraocular Movements: Extraocular movements intact. Cardiovascular:      Rate and Rhythm: Normal rate and regular rhythm. Pulmonary:      Effort: Pulmonary effort is normal.      Breath sounds: Normal breath sounds. Abdominal:      General: Abdomen is flat. Bowel sounds are normal. There is no distension. Palpations: Abdomen is soft. Tenderness: There is no abdominal tenderness. Musculoskeletal:         General: Normal range of motion. Cervical back: Normal range of motion. Comments: Patient has abnormal deformity in left shoulder consistent with a dislocation. He has tingling in his fingers but states he can still feel it light touch throughout the entire extremity. He has a +2 radial pulse with normal cap refill   Skin:     General: Skin is warm and dry. Neurological:      General: No focal deficit present. Mental Status: He is alert. Psychiatric:         Mood and Affect: Mood normal.        Procedural sedation    Date/Time: 12/15/2022 11:26 PM  Performed by: Caridad Chung MD  Authorized by:  Caridad Chung MD     Consent:     Consent obtained:  Verbal    Consent given by:  Patient    Risks, benefits, and alternatives were discussed: yes      Risks discussed:  Prolonged hypoxia resulting in organ damage, prolonged sedation necessitating reversal, respiratory compromise necessitating ventilatory assistance and intubation, vomiting, nausea and inadequate sedation    Alternatives discussed:  Analgesia without sedation  Universal protocol:     Patient identity confirmed:  Verbally with patient  Indications:     Procedure performed:  Dislocation reduction    Procedure necessitating sedation performed by:  Physician performing sedation    Intended level of sedation:  Moderate  Pre-sedation assessment:     NPO status caution: urgency dictates proceeding with non-ideal NPO status      ASA classification: class 1 - normal, healthy patient Mouth opening:  3 or more finger widths    Thyromental distance:  4 finger widths    Mallampati score:  I - soft palate, uvula, fauces, pillars visible    Neck mobility: normal      Pre-sedation assessments completed and reviewed: airway patency, anesthesia/sedation history, cardiovascular function, mental status, nausea/vomiting, pain level and respiratory function      History of difficult intubation: no    Immediate pre-procedure details:     Reviewed: vital signs      Verified: bag valve mask available, emergency equipment available, IV patency confirmed, oxygen available, reversal medications available and suction available    Procedure details (see MAR for exact dosages):     Preoxygenation:  Nasal cannula    Sedation:  Propofol    Intra-procedure monitoring:  Blood pressure monitoring, cardiac monitor, continuous capnometry, frequent LOC assessments, frequent vital sign checks and continuous pulse oximetry    Intra-procedure events: none    Post-procedure details:     Complications:  None    Patient is stable for discharge or admission: yes      Procedure completion:  Tolerated  Comments:      Patient received over 1 mg/kg of propofol including 2 mg of Versed and continued to be awake alert and talking. He was increased sedation but he never fell asleep or lost consciousness and was not talking the entire time. Patient immediately returned to baseline    Results for orders placed or performed during the hospital encounter of 12/15/22   XR SHOULDER LEFT (MIN 2 VIEWS)    Narrative    LEFT SHOULDER EXAM  12/15/2022 7:54 PM    INDICATION: Shoulder injury with pain, deformity. COMPARISON: None    FINDINGS: Neutral AP, Grashey, Scapular Y views submitted. Bone density is normal  There is no fracture, bone legion, there is however inferior dislocation of the  humeral head at the glenohumeral joint. .  No focal finding in the soft tissues. No advanced for age degenerative changes.       Impression INFERIOR SHOULDER DISLOCATION,   XR SHOULDER LEFT 1 VW    Narrative    EXAM: Left shoulder x-ray. INDICATION: Post reduction view. COMPARISON: Left shoulder x-rays earlier today. TECHNIQUE: A single frontal view of the left shoulder was obtained. FINDINGS: The previous inferior humeral head dislocation has been reduced, and  the glenohumeral joint appears to be in anatomic alignment on this single view. No fracture is identified. Impression    As above. XR SHOULDER LEFT 1 VW   Final Result   As above. XR SHOULDER LEFT (MIN 2 VIEWS)   Final Result   INFERIOR SHOULDER DISLOCATION,                          Voice dictation software was used during the making of this note. This software is not perfect and grammatical and other typographical errors may be present. This note has not been completely proofread for errors.      Estella Ferreira MD  12/15/22 0253

## 2022-12-16 NOTE — ED TRIAGE NOTES
Pt states he fell yesterday at work onto concrete floor on the L shoulder. Pt has had previous closed reduction of the shoulder in May of this year. Pt states joint currently feels unstable to him.

## 2022-12-16 NOTE — ED NOTES
I have reviewed discharge instructions with the patient. The patient verbalized understanding. Patient left ED via Discharge Method: ambulatory to Home family. Opportunity for questions and clarification provided. Patient given 0 scripts. To continue your aftercare when you leave the hospital, you may receive an automated call from our care team to check in on how you are doing. This is a free service and part of our promise to provide the best care and service to meet your aftercare needs.  If you have questions, or wish to unsubscribe from this service please call [phone]. Thank you for Choosing our [facility] Emergency Department.       Brandi García RN  12/15/22 9784 yes...

## 2022-12-16 NOTE — DISCHARGE INSTRUCTIONS
Use rest ice compression elevation with ibuprofen. Keep your sling on as frequently as possible.   Follow-up with your orthopedic surgeon